# Patient Record
Sex: FEMALE | Race: ASIAN | Employment: FULL TIME | URBAN - METROPOLITAN AREA
[De-identification: names, ages, dates, MRNs, and addresses within clinical notes are randomized per-mention and may not be internally consistent; named-entity substitution may affect disease eponyms.]

---

## 2017-01-05 ENCOUNTER — TRANSFERRED RECORDS (OUTPATIENT)
Dept: HEALTH INFORMATION MANAGEMENT | Facility: CLINIC | Age: 32
End: 2017-01-05

## 2017-06-19 ENCOUNTER — TRANSFERRED RECORDS (OUTPATIENT)
Dept: HEALTH INFORMATION MANAGEMENT | Facility: CLINIC | Age: 32
End: 2017-06-19

## 2017-06-24 ENCOUNTER — HEALTH MAINTENANCE LETTER (OUTPATIENT)
Age: 32
End: 2017-06-24

## 2017-10-24 ENCOUNTER — OFFICE VISIT (OUTPATIENT)
Dept: FAMILY MEDICINE | Facility: CLINIC | Age: 32
End: 2017-10-24
Payer: COMMERCIAL

## 2017-10-24 ENCOUNTER — RESULT FOLLOW UP (OUTPATIENT)
Dept: FAMILY MEDICINE | Facility: CLINIC | Age: 32
End: 2017-10-24

## 2017-10-24 VITALS
WEIGHT: 133 LBS | SYSTOLIC BLOOD PRESSURE: 120 MMHG | OXYGEN SATURATION: 99 % | BODY MASS INDEX: 21.38 KG/M2 | HEIGHT: 66 IN | HEART RATE: 65 BPM | DIASTOLIC BLOOD PRESSURE: 70 MMHG | TEMPERATURE: 96.7 F

## 2017-10-24 DIAGNOSIS — Z00.00 ENCOUNTER FOR ROUTINE ADULT HEALTH EXAMINATION WITHOUT ABNORMAL FINDINGS: Primary | ICD-10-CM

## 2017-10-24 DIAGNOSIS — Z11.3 SCREEN FOR STD (SEXUALLY TRANSMITTED DISEASE): ICD-10-CM

## 2017-10-24 DIAGNOSIS — Z12.4 SCREENING FOR MALIGNANT NEOPLASM OF CERVIX: ICD-10-CM

## 2017-10-24 DIAGNOSIS — R87.810 CERVICAL HIGH RISK HPV (HUMAN PAPILLOMAVIRUS) TEST POSITIVE: ICD-10-CM

## 2017-10-24 DIAGNOSIS — J45.20 MILD INTERMITTENT ASTHMA WITHOUT COMPLICATION: ICD-10-CM

## 2017-10-24 LAB
CHOLEST SERPL-MCNC: 187 MG/DL
GLUCOSE SERPL-MCNC: 59 MG/DL (ref 70–99)
HDLC SERPL-MCNC: 83 MG/DL
LDLC SERPL CALC-MCNC: 87 MG/DL
NONHDLC SERPL-MCNC: 104 MG/DL
TRIGL SERPL-MCNC: 85 MG/DL

## 2017-10-24 PROCEDURE — 87389 HIV-1 AG W/HIV-1&-2 AB AG IA: CPT | Performed by: PHYSICIAN ASSISTANT

## 2017-10-24 PROCEDURE — 87491 CHLMYD TRACH DNA AMP PROBE: CPT | Performed by: PHYSICIAN ASSISTANT

## 2017-10-24 PROCEDURE — 86780 TREPONEMA PALLIDUM: CPT | Performed by: PHYSICIAN ASSISTANT

## 2017-10-24 PROCEDURE — 87624 HPV HI-RISK TYP POOLED RSLT: CPT | Performed by: PHYSICIAN ASSISTANT

## 2017-10-24 PROCEDURE — G0145 SCR C/V CYTO,THINLAYER,RESCR: HCPCS | Performed by: PHYSICIAN ASSISTANT

## 2017-10-24 PROCEDURE — 36415 COLL VENOUS BLD VENIPUNCTURE: CPT | Performed by: PHYSICIAN ASSISTANT

## 2017-10-24 PROCEDURE — 99395 PREV VISIT EST AGE 18-39: CPT | Performed by: PHYSICIAN ASSISTANT

## 2017-10-24 PROCEDURE — 80061 LIPID PANEL: CPT | Performed by: PHYSICIAN ASSISTANT

## 2017-10-24 PROCEDURE — 82947 ASSAY GLUCOSE BLOOD QUANT: CPT | Performed by: PHYSICIAN ASSISTANT

## 2017-10-24 PROCEDURE — 87591 N.GONORRHOEAE DNA AMP PROB: CPT | Performed by: PHYSICIAN ASSISTANT

## 2017-10-24 RX ORDER — CITALOPRAM HYDROBROMIDE 40 MG/1
TABLET ORAL
Refills: 3 | COMMUNITY
Start: 2017-10-05 | End: 2020-01-09

## 2017-10-24 RX ORDER — ALPRAZOLAM 0.25 MG
TABLET ORAL
Refills: 1 | COMMUNITY
Start: 2017-10-05 | End: 2020-01-09

## 2017-10-24 RX ORDER — IBUPROFEN 800 MG/1
800 TABLET, FILM COATED ORAL EVERY 8 HOURS PRN
Qty: 40 TABLET | Refills: 1 | Status: CANCELLED | OUTPATIENT
Start: 2017-10-24

## 2017-10-24 RX ORDER — ALBUTEROL SULFATE 90 UG/1
AEROSOL, METERED RESPIRATORY (INHALATION)
Qty: 1 INHALER | Refills: 2 | Status: SHIPPED | OUTPATIENT
Start: 2017-10-24 | End: 2020-01-09

## 2017-10-24 NOTE — LETTER
November 19, 2018      Kay Claudine  [address]  [place] [address]    Dear ,      At [facility], your health and wellness is our primary concern. That is why we are following up on a positive high risk HPV test from 10/24/17. Your provider had recommended that you have a Pap smear and HPV test completed by 10/24/18. Our records do not show that this has been scheduled.    It is important to complete the follow up that your provider has suggested for you to ensure that there are no worsening changes which may, over time, develop into cancer.      Please contact our office at  [phone] to schedule an appointment for a Pap smear and HPV test at your earliest convenience. If you have questions or concerns, please call the clinic and we will be happy to assist you.    If you have completed the tests outside of [facility], please have the results forwarded to our office. We will update the chart for your primary Physician to review before your next annual physical.     Thank you for choosing [facility]!    Sincerely,      Mike Brantley PA-C/barry

## 2017-10-24 NOTE — LETTER
October 14, 2018      Kay Chow  [address] [place] [address]    Dear ,      At [facility], your health and wellness is our primary concern. That is why we are following up on a positive high risk HPV test from 10/24/17. Your provider had recommended that you have a Pap smear and HPV test completed by 10/24/18. Our records do not show that this has been scheduled.    It is important to complete the follow up that your provider has suggested for you to ensure that there are no worsening changes which may, over time, develop into cancer.      Please contact our office at  [phone] to schedule an appointment for a Pap smear and HPV test at your earliest convenience. If you have questions or concerns, please call the clinic and we will be happy to assist you.    If you have completed the tests outside of [facility], please have the results forwarded to our office. We will update the chart for your primary Physician to review before your next annual physical.     Thank you for choosing [facility]!    Sincerely,      Mike Brantley PA-C/barry

## 2017-10-24 NOTE — NURSING NOTE
"Chief Complaint   Patient presents with     Physical     non fasting       Initial /70 (BP Location: Left arm, Patient Position: Chair, Cuff Size: Adult Regular)  Pulse 65  Temp 96.7  F (35.9  C) (Tympanic)  Ht 5' 6\" (1.676 m)  Wt 133 lb (60.3 kg)  LMP 10/06/2017  SpO2 99%  BMI 21.47 kg/m2 Estimated body mass index is 21.47 kg/(m^2) as calculated from the following:    Height as of this encounter: 5' 6\" (1.676 m).    Weight as of this encounter: 133 lb (60.3 kg).  Medication Reconciliation: complete  "

## 2017-10-24 NOTE — PROGRESS NOTES
SUBJECTIVE:   CC: Kay Chow is an 32 year old woman who presents for preventive health visit.     Healthy Habits:    Do you get at least three servings of calcium containing foods daily (dairy, green leafy vegetables, etc.)? yes    Amount of exercise or daily activities, outside of work: 3 day(s) per week    Problems taking medications regularly No    Medication side effects: No    Have you had an eye exam in the past two years? no    Do you see a dentist twice per year? no    Do you have sleep apnea, excessive snoring or daytime drowsiness?yes no sleep study           PROBLEMS TO ADD ON... patient would like STD testing today.  Was last SA in April, found out her partner was unfaithful to her. She does not have any symptoms at this time, no specific known exposure.      Today's PHQ-2 Score:   PHQ-2 ( 1999 [employer]) 1/6/2016 12/10/2015   Q1: Little interest or pleasure in doing things 0 0   Q2: Feeling down, depressed or hopeless 0 0   PHQ-2 Score 0 0         Abuse: Current or Past(Physical, Sexual or Emotional)- NO  Do you feel safe in your environment - YES  Social History   Substance Use Topics     Smoking status: Former Smoker     Packs/day: 0.50     Years: 5.00     Types: Cigarettes     Quit date: 4/28/2009     Smokeless tobacco: Never Used     Alcohol use 0.0 - 0.6 oz/week     0 - 1 Standard drinks or equivalent per week      Comment: 5 drinks per year or less      The patient does not drink >3 drinks per day nor >7 drinks per week.    Reviewed orders with patient.  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 organ or system involvement with positive rheumatoid factor (H)     Past Surgical History:   Procedure Laterality Date     NO  HISTORY OF SURGERY         Social History   Substance Use Topics     Smoking status: Former Smoker     Packs/day: 0.50     Years: 5.00     Types: Cigarettes     Quit date: 4/28/2009     Smokeless tobacco: Never Used     Alcohol use 0.0 - 0.6 oz/week     0 - 1 Standard drinks or equivalent per week      Comment: 5 drinks per year or less      Family History   Problem Relation Age of Onset     Neurologic Disorder Mother      Bell's Palsy     CANCER Father      nasal tumor at age 42     Hypertension Maternal Grandmother      Hypertension Maternal Grandfather          Current Outpatient Prescriptions   Medication Sig Dispense Refill     methotrexate 2.5 MG tablet CHEMO   2     citalopram (CELEXA) 40 MG tablet   3     ALPRAZolam (XANAX) 0.25 MG tablet   1     albuterol (PROAIR HFA/PROVENTIL HFA/VENTOLIN HFA) 108 (90 BASE) MCG/ACT Inhaler INHALE 1-2 PUFFS EVERY 6 HOURS AS NEEDED 1 Inhaler 2     valACYclovir (VALTREX) 1000 mg tablet TAKE 2 TABLETS BY MOUTH 10-12 HOURS APART PER EPISODE 16 tablet 3     ibuprofen (ADVIL,MOTRIN) 800 MG tablet Take 1 tablet (800 mg) by mouth every 8 hours as needed for moderate pain 40 tablet 1     Cholecalciferol (VITAMIN D3 PO) Take 1,000 Units by mouth daily       Ascorbic Acid (VITAMIN C PO) Take 1,000 mg by mouth daily       cetirizine (ZYRTEC) 10 MG tablet Take 1 tablet (10 mg) by mouth every evening 30 tablet 1     [DISCONTINUED] albuterol (PROAIR HFA, PROVENTIL HFA, VENTOLIN HFA) 108 (90 BASE) MCG/ACT inhaler INHALE 1-2 PUFFS EVERY 6 HOURS AS NEEDED 1 Inhaler 2     Allergies   Allergen Reactions     No Known Allergies      Recent Labs   Lab Test  04/07/14   1046  08/15/12   0816  06/28/12   1312   LDL  112  110  123   HDL  56  62  62   TRIG  57  74  89   ALT  18  22  12   CR  0.62  0.70  0.71   GFRESTIMATED  >90  >90  >90   GFRESTBLACK  >90  >90  >90   POTASSIUM  4.5  4.3  4.1   TSH  1.51  2.57   --             Mammogram not appropriate for this patient based on age.    Pertinent  "mammograms are reviewed under the imaging tab.  History of abnormal Pap smear: NO - age 30- 65 PAP every 3 years recommended    Reviewed and updated as needed this visit by clinical staff  Tobacco  Allergies  Meds  Med Hx  Surg Hx  Fam Hx  Soc Hx        Reviewed and updated as needed this visit by Provider  Tobacco  Med Hx  Surg Hx  Fam Hx  Soc Hx       Past Medical History:   Diagnosis Date     Eczema      Graves' disease with thyrotoxic crisis     difuse goiter, Dr Pedro     Mild intermittent asthma     exercise induced     Rheumatoid arthritis (H) 2016     dx'd by rheumatologist      Seasonal allergic rhinitis       Past Surgical History:   Procedure Laterality Date     NO HISTORY OF SURGERY       Obstetric History       T0      L0     SAB0   TAB0   Ectopic0   Multiple0   Live Births0           ROS:  C: NEGATIVE for fever, chills, change in weight  I: NEGATIVE for worrisome rashes, moles or lesions  E: NEGATIVE for vision changes or irritation  ENT: NEGATIVE for ear, mouth and throat problems  R: NEGATIVE for significant cough or SOB  B: NEGATIVE for masses, tenderness or discharge  CV: NEGATIVE for chest pain, palpitations or peripheral edema  GI: NEGATIVE for nausea, abdominal pain, heartburn, or change in bowel habits  : NEGATIVE for unusual urinary or vaginal symptoms. Periods are regular.  M: NEGATIVE for significant arthralgias or myalgia  N: NEGATIVE for weakness, dizziness or paresthesias  P: NEGATIVE for changes in mood or affect    OBJECTIVE:   /70 (BP Location: Left arm, Patient Position: Chair, Cuff Size: Adult Regular)  Pulse 65  Temp 96.7  F (35.9  C) (Tympanic)  Ht 5' 6\" (1.676 m)  Wt 133 lb (60.3 kg)  LMP 10/06/2017  SpO2 99%  BMI 21.47 kg/m2  EXAM:  GENERAL: healthy, alert and no distress  EYES: Eyes grossly normal to inspection  HENT: ear canals and TM's normal, nose and mouth without ulcers or lesions  NECK: no adenopathy, no asymmetry, masses, or " scars and thyroid normal to palpation  RESP: lungs clear to auscultation - no rales, rhonchi or wheezes  BREAST: normal without masses, tenderness or nipple discharge and no palpable axillary masses or adenopathy  CV: regular rate and rhythm, normal S1 S2, no S3 or S4, no murmur, click or rub, no peripheral edema  ABDOMEN: soft, nontender, no hepatosplenomegaly, no masses and bowel sounds normal   (female): normal female external genitalia, normal urethral meatus, vaginal mucosa, normal cervix/adnexa/uterus without masses or discharge  MS: no gross musculoskeletal defects noted, no edema  SKIN: no suspicious lesions or rashes  NEURO: Normal strength and tone, mentation intact and speech normal  PSYCH: mentation appears normal, affect normal/bright    ASSESSMENT/PLAN:   1. Encounter for routine adult health examination without abnormal findings  - Lipid Profile (Chol, Trig, HDL, LDL calc)  - Glucose    2. Mild intermittent asthma without complication  See ACT, having some SOB prior to exercise, not using albuterol.  Advise that she begin using albuterol 30 minutes prior to exercise, RTC if symptoms persist, may need to add daily controller if this does not improve  - albuterol (PROAIR HFA/PROVENTIL HFA/VENTOLIN HFA) 108 (90 BASE) MCG/ACT Inhaler; INHALE 1-2 PUFFS EVERY 6 HOURS AS NEEDED  Dispense: 1 Inhaler; Refill: 2    3. Screen for STD (sexually transmitted disease)  - HIV Antigen Antibody Combo  - Anti Treponema  - Chlamydia trachomatis PCR  - Neisseria gonorrhoeae PCR    4. Screening for malignant neoplasm of cervix  - Pap imaged thin layer screen with HPV - recommended age 30 - 65 years (select HPV order below)  - HPV High Risk Types DNA Cervical    COUNSELING:   Reviewed preventive health counseling, as reflected in patient instructions       Regular exercise       Healthy diet/nutrition       Safe sex practices/STD prevention         reports that she quit smoking about 8 years ago. Her smoking use included  "Cigarettes. She has a 2.50 pack-year smoking history. She has never used smokeless tobacco.    Estimated body mass index is 21.47 kg/(m^2) as calculated from the following:    Height as of this encounter: 5' 6\" (1.676 m).    Weight as of this encounter: 133 lb (60.3 kg).         Counseling Resources:  ATP IV Guidelines  Pooled Cohorts Equation Calculator  Breast Cancer Risk Calculator  FRAX Risk Assessment  ICSI Preventive Guidelines  Dietary Guidelines for Americans, 2010  USDA's MyPlate  ASA Prophylaxis  Lung CA Screening    Mike Brantley PA-C  [facility]  "

## 2017-10-24 NOTE — MR AVS SNAPSHOT
After Visit Summary   10/24/2017    Kay Chow    MRN: [id number]           Patient Information     Date Of Birth          1985        Visit Information        Provider Department      10/24/2017 9:00 AM Mike Brantley PA-C [facility]        Today's Diagnoses     Encounter for routine adult health examination without abnormal findings    -  1    Screening for malignant neoplasm of cervix        Need for prophylactic vaccination and inoculation against influenza        Cervicalgia        Costochondritis        Screen for STD (sexually transmitted disease)        Mild intermittent asthma without complication          Care Instructions      Preventive Health Recommendations  Female Ages 26 - 39  Yearly exam:   See your health care provider every year in order to    Review health changes.     Discuss preventive care.      Review your medicines if you your doctor has prescribed any.    Until age 30: Get a Pap test every three years (more often if you have had an abnormal result).    After age 30: Talk to your doctor about whether you should have a Pap test every 3 years or have a Pap test with HPV screening every 5 years.   You do not need a Pap test if your uterus was removed (hysterectomy) and you have not had cancer.  You should be tested each year for STDs (sexually transmitted diseases), if you're at risk.   Talk to your provider about how often to have your cholesterol checked.  If you are at risk for diabetes, you should have a diabetes test (fasting glucose).  Shots: Get a flu shot each year. Get a tetanus shot every 10 years.   Nutrition:     Eat at least 5 servings of fruits and vegetables each day.    Eat whole-grain bread, whole-wheat pasta and brown rice instead of white grains and rice.    Talk to your provider about Calcium and Vitamin D.     Lifestyle    Exercise at least 150 minutes a week (30 minutes a day, 5 days of the week). This will help you control  "your weight and prevent disease.    Limit alcohol to one drink per day.    No smoking.     Wear sunscreen to prevent skin cancer.    See your dentist every six months for an exam and cleaning.            Follow-ups after your visit        Who to contact     If you have questions or need follow up information about today's clinic visit or your schedule please contact [facility] [place] directly at [phone].  Normal or non-critical lab and imaging results will be communicated to you by [employer], letter or phone within 4 business days after the clinic has received the results. If you do not hear from us within 7 days, please contact the clinic through [employer] or phone. If you have a critical or abnormal lab result, we will notify you by phone as soon as possible.  Submit refill requests through [employer] or call your pharmacy and they will forward the refill request to us. Please allow 3 business days for your refill to be completed.          Additional Information About Your Visit        [facility] Information     [employer] gives you secure access to your electronic health record. If you see a primary care provider, you can also send messages to your care team and make appointments. If you have questions, please call your primary care clinic.  If you do not have a primary care provider, please call [phone] and they will assist you.        Care EveryWhere ID     This is your Care EveryWhere ID. This could be used by other organizations to access your [facility] medical records  [id number]        Your Vitals Were     Pulse Temperature Height Last Period Pulse Oximetry BMI (Body Mass Index)    65 96.7  F (35.9  C) (Tympanic) 5' 6\" (1.676 m) 10/06/2017 99% 21.47 kg/m2       Blood Pressure from Last 3 Encounters:   10/24/17 120/70   03/31/16 110/80   01/06/16 117/79    Weight from Last 3 Encounters:   10/24/17 133 lb (60.3 kg)   03/31/16 146 lb (66.2 kg)   01/06/16 146 lb (66.2 kg)              We Performed the " Following     Anti Treponema     Chlamydia trachomatis PCR     Glucose     HIV Antigen Antibody Combo     HPV High Risk Types DNA Cervical     Lipid Profile (Chol, Trig, HDL, LDL calc)     Neisseria gonorrhoeae PCR     Pap imaged thin layer screen with HPV - recommended age 30 - 65 years (select HPV order below)          Where to get your medicines      These medications were sent to [id number] - [place], [place] - [address]  [address], [place] [address]     Phone:  [phone]     albuterol 108 (90 BASE) MCG/ACT Inhaler          Primary Care Provider Office Phone # Fax #    Mike Brantley PA-C [phone]       [address]  [place] [address]        Equal Access to Services     LILIBETH BERNAL : Hadii humphrey guidry hadasho Soomaali, waaxda luqadaha, qaybta kaalmada adeegyada, jazmine salas. So [facility] [phone].    ATENCIÓN: Si habla español, tiene a boss disposición servicios gratuitos de asistencia lingüística. Llame al [phone].    We comply with applicable federal civil rights laws and [place] laws. We do not discriminate on the basis of race, color, national origin, age, disability, sex, sexual orientation, or gender identity.            Thank you!     Thank you for choosing [facility] [place]  for your care. Our goal is always to provide you with excellent care. Hearing back from our patients is one way we can continue to improve our services. Please take a few minutes to complete the written survey that you may receive in the mail after your visit with us. Thank you!             Your Updated Medication List - Protect others around you: Learn how to safely use, store and throw away your medicines at www.disposemymeds.org.          This list is accurate as of: 10/24/17  9:38 AM.  Always use your most recent med list.                   Brand Name Dispense Instructions for use Diagnosis    albuterol 108 (90 BASE) MCG/ACT  Inhaler    PROAIR HFA/PROVENTIL HFA/VENTOLIN HFA    1 Inhaler    INHALE 1-2 PUFFS EVERY 6 HOURS AS NEEDED    Mild intermittent asthma without complication       ALPRAZolam 0.25 MG tablet    XANAX          cetirizine 10 MG tablet    zyrTEC    30 tablet    Take 1 tablet (10 mg) by mouth every evening    Seasonal allergic rhinitis       citalopram 40 MG tablet    celeXA          ibuprofen 800 MG tablet    ADVIL/MOTRIN    40 tablet    Take 1 tablet (800 mg) by mouth every 8 hours as needed for moderate pain    Cervicalgia, Costochondritis       methotrexate 2.5 MG tablet CHEMO           valACYclovir 1000 mg tablet    VALTREX    16 tablet    TAKE 2 TABLETS BY MOUTH 10-12 HOURS APART PER EPISODE    Recurrent cold sores       VITAMIN C PO      Take 1,000 mg by mouth daily        VITAMIN D3 PO      Take 1,000 Units by mouth daily

## 2017-10-24 NOTE — LETTER
October 14, 2018      Kay Chow  [address] [place] [address]    Dear ,      At [facility], your health and wellness is our primary concern. That is why we are following up on a positive high risk HPV test from 10/24/17. Your provider had recommended that you have a Pap smear and HPV test completed by 10/24/18. Our records do not show that this has been scheduled.    It is important to complete the follow up that your provider has suggested for you to ensure that there are no worsening changes which may, over time, develop into cancer.      Please contact our office at  [phone] to schedule an appointment for a Pap smear and HPV test at your earliest convenience. If you have questions or concerns, please call the clinic and we will be happy to assist you.    If you have completed the tests outside of [facility], please have the results forwarded to our office. We will update the chart for your primary Physician to review before your next annual physical.     Thank you for choosing [facility]!    Sincerely,      Mike Brantley PA-C/barry

## 2017-10-25 LAB
C TRACH DNA SPEC QL NAA+PROBE: NEGATIVE
HIV 1+2 AB+HIV1 P24 AG SERPL QL IA: NONREACTIVE
N GONORRHOEA DNA SPEC QL NAA+PROBE: NEGATIVE
SPECIMEN SOURCE: NORMAL
SPECIMEN SOURCE: NORMAL
T PALLIDUM IGG+IGM SER QL: NEGATIVE

## 2017-10-25 ASSESSMENT — ASTHMA QUESTIONNAIRES: ACT_TOTALSCORE: 16

## 2017-10-26 LAB
COPATH REPORT: NORMAL
PAP: NORMAL

## 2017-10-26 NOTE — PROGRESS NOTES
Kay-  Here are your recent results.    -Your HIV, syphilis, gonorrhea and chlamydia tests are all negative    -Your cholesterol and blood sugar levels are normal. We can recheck your cholesterol again in 5 years, and will recheck your blood sugar periodically.  Please let me know if you have questions.    If you have any questions please do not hesitate to contact our office via phone ([phone]) or you may send me a message via [employer] by clicking the contact my Care Team link.      It was a pleasure participating in your care!    Thank you,    Mike Brantley MPH, PA-C  [address] [place] [address] [phone]

## 2017-10-30 LAB
FINAL DIAGNOSIS: ABNORMAL
HPV HR 12 DNA CVX QL NAA+PROBE: POSITIVE
HPV16 DNA SPEC QL NAA+PROBE: NEGATIVE
HPV18 DNA SPEC QL NAA+PROBE: NEGATIVE
SPECIMEN DESCRIPTION: ABNORMAL

## 2017-10-30 NOTE — PROGRESS NOTES
10/24/17 NIL pap, + HR HPV (not 16 or 18). Plan: [facility] in 1 year.  10/14/18 [facility] reminder letter sent (rlm)  11/02/18 Reminder letter was returned. Address updated in demo, did not re-send letter as pt is scheduled 11/12/18. ([facility])  11/12/18 Appt - No Show (rlm)  11/19/18 [facility] reminder letter sent (rl)  12/18/18 [facility] clinic and schedule. ([facility])  01/02/19 Patient is lost to pap tracking follow-up. FYI routed to provider. ([facility])

## 2017-11-09 ENCOUNTER — TRANSFERRED RECORDS (OUTPATIENT)
Dept: HEALTH INFORMATION MANAGEMENT | Facility: CLINIC | Age: 32
End: 2017-11-09

## 2017-11-09 LAB
ALT SERPL-CCNC: 15 IU/L (ref 5–35)
AST SERPL-CCNC: 18 U/L (ref 5–34)
CREAT SERPL-MCNC: 0.54 MG/DL (ref 0.5–1.3)
GFR SERPL CREATININE-BSD FRML MDRD: 140.9 ML/MIN/1.73M2

## 2018-10-22 DIAGNOSIS — B00.1 RECURRENT COLD SORES: ICD-10-CM

## 2018-10-22 RX ORDER — VALACYCLOVIR HYDROCHLORIDE 1 G/1
TABLET, FILM COATED ORAL
Qty: 16 TABLET | Refills: 0 | Status: SHIPPED | OUTPATIENT
Start: 2018-10-22

## 2018-10-22 NOTE — TELEPHONE ENCOUNTER
Verify patient use as there is a break in medication and patient due for yearly appointment and labs.  Yanique Bui RN - Triage  [facility]

## 2018-10-22 NOTE — TELEPHONE ENCOUNTER
Spoke with patient who reports that she is taking this for outbreaks only and not daily. She has appt scheduled for 10/29/18. Prescription approved per [facility] Refill Protocol.  Kathia Hernández RN   [facility] - Triage

## 2018-10-22 NOTE — TELEPHONE ENCOUNTER
"Requested Prescriptions   Pending Prescriptions Disp Refills     valACYclovir (VALTREX) 1000 mg tablet  Last Written Prescription Date:  10-  Last Fill Quantity: 16 tablet,  # refills: 3   Last office visit: 10/24/2017 with prescribing provider:     Future Office Visit:     16 tablet 3     Sig: TAKE 2 TABLETS BY MOUTH 10-12 HOURS APART PER EPISODE    Antivirals for Herpes Protocol Passed    10/22/2018  8:44 AM       Passed - Patient is age 12 or older       Passed - Recent (12 mo) or future (30 days) visit within the authorizing provider's specialty    Patient had office visit in the last 12 months or has a visit in the next 30 days with authorizing provider or within the authorizing provider's specialty.  See \"Patient Info\" tab in inbasket, or \"Choose Columns\" in Meds & Orders section of the refill encounter.             Passed - Normal serum creatinine on file in past 12 months    Recent Labs   Lab Test 11/09/17   CR  0.540               "

## 2018-11-24 ENCOUNTER — HEALTH MAINTENANCE LETTER (OUTPATIENT)
Age: 33
End: 2018-11-24

## 2018-12-11 ENCOUNTER — OFFICE VISIT (OUTPATIENT)
Dept: FAMILY MEDICINE | Facility: CLINIC | Age: 33
End: 2018-12-11
Payer: COMMERCIAL

## 2018-12-11 VITALS
SYSTOLIC BLOOD PRESSURE: 122 MMHG | BODY MASS INDEX: 22.27 KG/M2 | OXYGEN SATURATION: 99 % | HEART RATE: 88 BPM | DIASTOLIC BLOOD PRESSURE: 90 MMHG | WEIGHT: 138 LBS | TEMPERATURE: 98.9 F

## 2018-12-11 DIAGNOSIS — R79.89 POSITIVE D DIMER: ICD-10-CM

## 2018-12-11 DIAGNOSIS — R10.13 ABDOMINAL PAIN, EPIGASTRIC: ICD-10-CM

## 2018-12-11 DIAGNOSIS — R06.02 SOB (SHORTNESS OF BREATH): Primary | ICD-10-CM

## 2018-12-11 LAB — D DIMER PPP FEU-MCNC: 0.7 UG/ML FEU (ref 0–0.5)

## 2018-12-11 PROCEDURE — 85379 FIBRIN DEGRADATION QUANT: CPT | Performed by: FAMILY MEDICINE

## 2018-12-11 PROCEDURE — 99215 OFFICE O/P EST HI 40 MIN: CPT | Performed by: FAMILY MEDICINE

## 2018-12-11 PROCEDURE — 36415 COLL VENOUS BLD VENIPUNCTURE: CPT | Performed by: FAMILY MEDICINE

## 2018-12-11 RX ORDER — OMEPRAZOLE 40 MG/1
40 CAPSULE, DELAYED RELEASE ORAL DAILY
Qty: 90 CAPSULE | Refills: 3 | Status: SHIPPED | OUTPATIENT
Start: 2018-12-11 | End: 2020-01-09

## 2018-12-11 RX ORDER — ONDANSETRON 4 MG/1
4 TABLET, ORALLY DISINTEGRATING ORAL
COMMUNITY
Start: 2018-12-09 | End: 2020-01-09

## 2018-12-11 RX ORDER — HYDROCODONE BITARTRATE AND ACETAMINOPHEN 5; 325 MG/1; MG/1
TABLET ORAL
Refills: 0 | COMMUNITY
Start: 2018-12-08 | End: 2020-01-09

## 2018-12-11 ASSESSMENT — ANXIETY QUESTIONNAIRES
1. FEELING NERVOUS, ANXIOUS, OR ON EDGE: NOT AT ALL
5. BEING SO RESTLESS THAT IT IS HARD TO SIT STILL: NOT AT ALL
6. BECOMING EASILY ANNOYED OR IRRITABLE: SEVERAL DAYS
GAD7 TOTAL SCORE: 2
7. FEELING AFRAID AS IF SOMETHING AWFUL MIGHT HAPPEN: NOT AT ALL
2. NOT BEING ABLE TO STOP OR CONTROL WORRYING: NOT AT ALL
3. WORRYING TOO MUCH ABOUT DIFFERENT THINGS: SEVERAL DAYS

## 2018-12-11 ASSESSMENT — PATIENT HEALTH QUESTIONNAIRE - PHQ9
5. POOR APPETITE OR OVEREATING: NOT AT ALL
SUM OF ALL RESPONSES TO PHQ QUESTIONS 1-9: 7

## 2018-12-11 NOTE — PROGRESS NOTES
SUBJECTIVE:   Kay Chow is a 33 year old female who presents to clinic today for the following health issues:      ED/UC Followup:    Facility:  Seen in [place] ER and  [facility]  Date of visit:  and   Reason for visit: nausea/vomiting   Current Status: on and off abd pain, increasing nausea and chest pain        Records from [place] are not available.  Patient tells that she had a CT scan done there which came back negative for abdominal pathologies.  Patient had an x-ray of the abdomen at [facility] which also was normal.  She has ongoing discomfort in the abdomen.  Her labs were reviewed.  Pregnancy test was negative.  Patient reports of ongoing shortness of breath.  It worsens with exertion.  No history of pulmonary embolism.  She is worried more about the shortness of breath now than abdominal pain.  She has not been on any hormones recently.      Problem list and histories reviewed & adjusted, as indicated.  Additional history: as documented    Patient Active Problem List   Diagnosis     Recurrent cold sores     Eczema     CARDIOVASCULAR SCREENING; LDL GOAL LESS THAN 160     Dysmenorrhea     Mild intermittent asthma     Seasonal allergic rhinitis     Rheumatoid arthritis of multiple sites without organ or system involvement with positive rheumatoid factor (H)     Cervical high risk HPV (human papillomavirus) test positive     Past Surgical History:   Procedure Laterality Date     NO HISTORY OF SURGERY         Social History     Tobacco Use     Smoking status: Former Smoker     Packs/day: 0.50     Years: 5.00     Pack years: 2.50     Types: Cigarettes     Last attempt to quit: 2009     Years since quittin.6     Smokeless tobacco: Never Used   Substance Use Topics     Alcohol use: Yes     Alcohol/week: 0.0 - 0.6 oz     Comment: 5 drinks per year or less      Family History   Problem Relation Age of Onset     Neurologic Disorder Mother         Bell's Palsy     Cancer Father          nasal tumor at age 42     Hypertension Maternal Grandmother      Hypertension Maternal Grandfather          Current Outpatient Medications   Medication Sig Dispense Refill     albuterol (PROAIR HFA/PROVENTIL HFA/VENTOLIN HFA) 108 (90 BASE) MCG/ACT Inhaler INHALE 1-2 PUFFS EVERY 6 HOURS AS NEEDED 1 Inhaler 2     ALPRAZolam (XANAX) 0.25 MG tablet   1     Ascorbic Acid (VITAMIN C PO) Take 1,000 mg by mouth daily       cetirizine (ZYRTEC) 10 MG tablet Take 1 tablet (10 mg) by mouth every evening 30 tablet 1     Cholecalciferol (VITAMIN D3 PO) Take 1,000 Units by mouth daily       citalopram (CELEXA) 40 MG tablet   3     HYDROcodone-acetaminophen (NORCO) 5-325 MG tablet TAKE 1 TO 2 TABLETS BY MOUTH EVERY 4 TO 6 HOURS  0     methotrexate 2.5 MG tablet CHEMO   2     omeprazole (PRILOSEC) 40 MG DR capsule Take 1 capsule (40 mg) by mouth daily 90 capsule 3     ondansetron (ZOFRAN-ODT) 4 MG ODT tab Take 4 mg by mouth       valACYclovir (VALTREX) 1000 mg tablet TAKE 2 TABLETS BY MOUTH 10-12 HOURS APART PER EPISODE 16 tablet 0     Allergies   Allergen Reactions     No Known Allergies        Reviewed and updated as needed this visit by clinical staff  Tobacco  Allergies  Meds       Reviewed and updated as needed this visit by Provider  Allergies         ROS:  CONSTITUTIONAL: NEGATIVE for fever, chills, change in weight  INTEGUMENTARY/SKIN: NEGATIVE for worrisome rashes, moles or lesions  CV: NEGATIVE for chest pain, palpitations or peripheral edema  : normal menstrual cycles    OBJECTIVE:                                                    /90   Pulse 88   Temp 98.9  F (37.2  C) (Tympanic)   Wt 62.6 kg (138 lb)   LMP 11/27/2018 (Approximate)   SpO2 99%   BMI 22.27 kg/m    Body mass index is 22.27 kg/m .   GENERAL: Mild distress.  Appears to be short of breath.  HENT: ear canals- normal; TMs- normal; Nose- normal; Mouth- no ulcers, no lesions  NECK: no tenderness, no adenopathy, no asymmetry, no masses, no  stiffness; thyroid- normal to palpation  RESP: lungs clear to auscultation - no rales, no rhonchi, no wheezes  CV: regular rates and rhythm, normal S1 S2, no S3 or S4 and no murmur, no click or rub -  ABDOMEN: soft, epigastric tenderness noted on exam., no  hepatosplenomegaly, no masses, normal bowel sounds  SKIN: no suspicious lesions, no rashes    Results for orders placed or performed in visit on 12/11/18   D dimer, quantitative   Result Value Ref Range    D Dimer 0.7 (H) 0.0 - 0.50 ug/ml FEU          ASSESSMENT/PLAN:                                                      1. SOB (shortness of breath)  Stat d-dimer ordered.  D-dimer came back positive.  CT scan to rule out PE as the next step was discussed with the patient.    CT scan of the chest to rule out PE ordered  - D dimer, quantitative  - CT Chest Pulmonary Embolism w Contrast; Future    2. Abdominal pain, epigastric  Questionable etiology.  Patient has had a CAT scan and an x-ray done recently and lab investigation at the ERs which did not reveal any problems.  Recommending to start omeprazole for epigastric discomfort.  Referring the patient to gastroenterology for evaluation and management.  She may need an endoscopy to evaluate that epigastric discomfort further.  - GASTROENTEROLOGY ADULT REF CONSULT ONLY  - omeprazole (PRILOSEC) 40 MG DR capsule; Take 1 capsule (40 mg) by mouth daily  Dispense: 90 capsule; Refill: 3    3. Positive D dimer    - CT Chest Pulmonary Embolism w Contrast; Future          Alee Quiles MD  [facility] [place]

## 2018-12-12 ENCOUNTER — HOSPITAL ENCOUNTER (OUTPATIENT)
Dept: CT IMAGING | Facility: CLINIC | Age: 33
Discharge: HOME | End: 2018-12-12
Attending: FAMILY MEDICINE | Admitting: FAMILY MEDICINE
Payer: COMMERCIAL

## 2018-12-12 DIAGNOSIS — R79.89 POSITIVE D DIMER: ICD-10-CM

## 2018-12-12 DIAGNOSIS — R06.02 SOB (SHORTNESS OF BREATH): ICD-10-CM

## 2018-12-12 PROCEDURE — 25000128 H RX IP 250 OP 636: Performed by: FAMILY MEDICINE

## 2018-12-12 PROCEDURE — 25000125 ZZHC RX 250: Performed by: FAMILY MEDICINE

## 2018-12-12 PROCEDURE — 71260 CT THORAX DX C+: CPT

## 2018-12-12 RX ORDER — IOPAMIDOL 755 MG/ML
58 INJECTION, SOLUTION INTRAVASCULAR ONCE
Status: COMPLETED | OUTPATIENT
Start: 2018-12-12 | End: 2018-12-12

## 2018-12-12 RX ADMIN — IOPAMIDOL 58 ML: 755 INJECTION, SOLUTION INTRAVENOUS at 10:58

## 2018-12-12 RX ADMIN — SODIUM CHLORIDE 84 ML: 9 INJECTION, SOLUTION INTRAVENOUS at 10:58

## 2018-12-12 ASSESSMENT — ANXIETY QUESTIONNAIRES: GAD7 TOTAL SCORE: 2

## 2018-12-12 NOTE — RESULT ENCOUNTER NOTE
Please call the patient to notify patient that the blood work is positive for d-dimer.  I would like her to get a CT scan to rule out PE.  CT scan ordered to be done today.    Alee Quiles MD

## 2018-12-12 NOTE — RESULT ENCOUNTER NOTE
Please call the patient to notify patient that the CT of the lung shows no signs of blood clot.  No other abnormality noted in the lungs.    Alee Quiles MD

## 2018-12-18 ENCOUNTER — TELEPHONE (OUTPATIENT)
Dept: FAMILY MEDICINE | Facility: CLINIC | Age: 33
End: 2018-12-18

## 2018-12-18 NOTE — TELEPHONE ENCOUNTER
Pt is past due for f/u pap smear.  [facility] clinic and schedule.  Maritza Schwartz,    Pap Tracking

## 2019-01-01 ENCOUNTER — TRANSFERRED RECORDS (OUTPATIENT)
Dept: HEALTH INFORMATION MANAGEMENT | Facility: CLINIC | Age: 34
End: 2019-01-01

## 2019-01-01 LAB — PAP SMEAR - HIM PATIENT REPORTED: NEGATIVE

## 2019-01-02 PROBLEM — R87.810 CERVICAL HIGH RISK HPV (HUMAN PAPILLOMAVIRUS) TEST POSITIVE: Status: ACTIVE | Noted: 2017-10-24

## 2019-01-08 ENCOUNTER — TRANSFERRED RECORDS (OUTPATIENT)
Dept: HEALTH INFORMATION MANAGEMENT | Facility: CLINIC | Age: 34
End: 2019-01-08

## 2019-01-08 LAB
C TRACH DNA SPEC QL PROBE+SIG AMP: NEGATIVE
SPECIMEN DESCRIPTION: NORMAL

## 2019-01-22 ENCOUNTER — TRANSFERRED RECORDS (OUTPATIENT)
Dept: HEALTH INFORMATION MANAGEMENT | Facility: CLINIC | Age: 34
End: 2019-01-22

## 2019-10-02 ENCOUNTER — HEALTH MAINTENANCE LETTER (OUTPATIENT)
Age: 34
End: 2019-10-02

## 2020-01-09 ENCOUNTER — OFFICE VISIT (OUTPATIENT)
Dept: FAMILY MEDICINE | Facility: CLINIC | Age: 35
End: 2020-01-09
Payer: COMMERCIAL

## 2020-01-09 VITALS
WEIGHT: 141 LBS | DIASTOLIC BLOOD PRESSURE: 70 MMHG | OXYGEN SATURATION: 98 % | TEMPERATURE: 97.6 F | SYSTOLIC BLOOD PRESSURE: 108 MMHG | HEART RATE: 70 BPM | BODY MASS INDEX: 22.76 KG/M2

## 2020-01-09 DIAGNOSIS — J01.90 ACUTE SINUSITIS WITH SYMPTOMS > 10 DAYS: Primary | ICD-10-CM

## 2020-01-09 PROCEDURE — 99213 OFFICE O/P EST LOW 20 MIN: CPT | Performed by: FAMILY MEDICINE

## 2020-01-09 NOTE — PROGRESS NOTES
Subjective     Kay Chow is a 34 year old female who presents to clinic today for the following health issues:    HPI   RESPIRATORY SYMPTOMS      Duration: over a week     Description  nasal congestion, rhinorrhea and cough    Severity: moderate    Accompanying signs and symptoms: None    History (predisposing factors):  none    Precipitating or alleviating factors: None    Therapies tried and outcome:  oral decongestant      Patient Active Problem List   Diagnosis     Recurrent cold sores     Eczema     CARDIOVASCULAR SCREENING; LDL GOAL LESS THAN 160     Dysmenorrhea     Mild intermittent asthma     Seasonal allergic rhinitis     Rheumatoid arthritis of multiple sites without organ or system involvement with positive rheumatoid factor (H)     Cervical high risk HPV (human papillomavirus) test positive     Past Surgical History:   Procedure Laterality Date     NO HISTORY OF SURGERY         Social History     Tobacco Use     Smoking status: Former Smoker     Packs/day: 0.50     Years: 5.00     Pack years: 2.50     Types: Cigarettes     Last attempt to quit: 4/28/2009     Years since quitting: 10.7     Smokeless tobacco: Never Used   Substance Use Topics     Alcohol use: Yes     Alcohol/week: 0.0 - 1.0 standard drinks     Comment: 5 drinks per year or less      Family History   Problem Relation Age of Onset     Neurologic Disorder Mother         Bell's Palsy     Cancer Father         nasal tumor at age 42     Hypertension Maternal Grandmother      Hypertension Maternal Grandfather          Current Outpatient Medications   Medication Sig Dispense Refill     amoxicillin-clavulanate (AUGMENTIN) 875-125 MG tablet Take 1 tablet by mouth 2 times daily 20 tablet 0     methotrexate 2.5 MG tablet CHEMO   2     valACYclovir (VALTREX) 1000 mg tablet TAKE 2 TABLETS BY MOUTH 10-12 HOURS APART PER EPISODE 16 tablet 0     Allergies   Allergen Reactions     No Known Allergies      Recent Labs   Lab Test 11/09/17 10/24/17  5756  04/07/14  1046 08/15/12  0816   LDL  --  87 112 110   HDL  --  83 56 62   TRIG  --  85 57 74   ALT 15  --  18 22   CR 0.540  --  0.62 0.70   GFRESTIMATED 140.9  --  >90 >90   GFRESTBLACK  --   --  >90 >90   POTASSIUM  --   --  4.5 4.3   TSH  --   --  1.51 2.57      BP Readings from Last 3 Encounters:   01/09/20 108/70   12/11/18 122/90   10/24/17 120/70    Wt Readings from Last 3 Encounters:   01/09/20 64 kg (141 lb)   12/11/18 62.6 kg (138 lb)   10/24/17 60.3 kg (133 lb)                    Reviewed and updated as needed this visit by Provider         Review of Systems   ROS COMP: Constitutional, HEENT, cardiovascular, pulmonary, gi and gu systems are negative, except as otherwise noted.      Objective    /70   Pulse 70   Temp 97.6  F (36.4  C) (Tympanic)   Wt 64 kg (141 lb)   LMP 12/05/2019   SpO2 98%   BMI 22.76 kg/m    Body mass index is 22.76 kg/m .  Physical Exam   GENERAL: healthy, alert and no distress  NECK: no adenopathy, no asymmetry, masses, or scars and thyroid normal to palpation  HENT:purulent postnasal drainage with sinus tenderness   RESP: lungs clear to auscultation - no rales, rhonchi or wheezes  CV: regular rate and rhythm, normal S1 S2, no S3 or S4, no murmur, click or rub, no peripheral edema and peripheral pulses strong  ABDOMEN: soft, nontender, no hepatosplenomegaly, no masses and bowel sounds normal  MS: no gross musculoskeletal defects noted, no edema            Assessment & Plan       ICD-10-CM    1. Acute sinusitis with symptoms > 10 days J01.90 amoxicillin-clavulanate (AUGMENTIN) 875-125 MG tablet          FUTURE APPOINTMENTS:       - Follow-up visit prn     No follow-ups on file.    Prince To MD  [facility] [place]

## 2020-01-09 NOTE — Clinical Note
Please abstract the following data from this visit with this patient into the appropriate field in Epic:Tests that can be patient reported without a hard copy:Pap smear done on this date: 1/2019 (approximately), by this group: [facility] , results were  Normal . Other Tests found in the patient's chart through Chart Review/Care Everywhere:{Abstract Quality List (Optional):[id number]}Note to Abstraction: If this section is blank, no results were found via Chart Review/Care Everywhere.

## 2020-03-22 ENCOUNTER — HEALTH MAINTENANCE LETTER (OUTPATIENT)
Age: 35
End: 2020-03-22

## 2020-12-09 ENCOUNTER — TELEPHONE (OUTPATIENT)
Dept: FAMILY MEDICINE | Facility: CLINIC | Age: 35
End: 2020-12-09

## 2020-12-09 NOTE — TELEPHONE ENCOUNTER
Needs of attention regarding:  -Asthma    Health Maintenance Topics with due status: Overdue       Topic Date Due    Pneumococcal Vaccine: Pediatrics (0 to 5 Years) and At-Risk Patients (6 to 64 Years) 06/09/1993    ASTHMA ACTION PLAN 04/07/2015    ASTHMA CONTROL TEST 04/24/2018    PREVENTIVE CARE VISIT 10/24/2018    HPV TEST 01/01/2020    PAP 01/01/2020    PHQ-2 01/01/2020    INFLUENZA VACCINE 09/01/2020       Communication:  See Letter

## 2021-01-15 ENCOUNTER — HEALTH MAINTENANCE LETTER (OUTPATIENT)
Age: 36
End: 2021-01-15

## 2021-09-05 ENCOUNTER — HEALTH MAINTENANCE LETTER (OUTPATIENT)
Age: 36
End: 2021-09-05

## 2022-02-20 ENCOUNTER — HEALTH MAINTENANCE LETTER (OUTPATIENT)
Age: 37
End: 2022-02-20

## 2022-10-23 ENCOUNTER — HEALTH MAINTENANCE LETTER (OUTPATIENT)
Age: 37
End: 2022-10-23

## 2023-04-02 ENCOUNTER — HEALTH MAINTENANCE LETTER (OUTPATIENT)
Age: 38
End: 2023-04-02

## 2024-06-02 ENCOUNTER — HEALTH MAINTENANCE LETTER (OUTPATIENT)
Age: 39
End: 2024-06-02